# Patient Record
Sex: MALE | Race: WHITE | NOT HISPANIC OR LATINO | URBAN - METROPOLITAN AREA
[De-identification: names, ages, dates, MRNs, and addresses within clinical notes are randomized per-mention and may not be internally consistent; named-entity substitution may affect disease eponyms.]

---

## 2024-04-30 ENCOUNTER — APPOINTMENT (OUTPATIENT)
Dept: URBAN - METROPOLITAN AREA CLINIC 256 | Age: 69
Setting detail: DERMATOLOGY
End: 2024-04-30

## 2024-04-30 DIAGNOSIS — L85.3 XEROSIS CUTIS: ICD-10-CM

## 2024-04-30 DIAGNOSIS — L72.8 OTHER FOLLICULAR CYSTS OF THE SKIN AND SUBCUTANEOUS TISSUE: ICD-10-CM

## 2024-04-30 DIAGNOSIS — L08.9 LOCAL INFECTION OF THE SKIN AND SUBCUTANEOUS TISSUE, UNSPECIFIED: ICD-10-CM

## 2024-04-30 PROCEDURE — OTHER MONITORING: OTHER

## 2024-04-30 PROCEDURE — OTHER MIPS QUALITY: OTHER

## 2024-04-30 PROCEDURE — OTHER PATIENT SPECIFIC COUNSELING: OTHER

## 2024-04-30 PROCEDURE — OTHER PRESCRIPTION MEDICATION MANAGEMENT: OTHER

## 2024-04-30 PROCEDURE — OTHER PRESCRIPTION: OTHER

## 2024-04-30 PROCEDURE — OTHER COUNSELING: OTHER

## 2024-04-30 PROCEDURE — OTHER PHOTO-DOCUMENTATION: OTHER

## 2024-04-30 PROCEDURE — 99214 OFFICE O/P EST MOD 30 MIN: CPT

## 2024-04-30 RX ORDER — CLINDAMYCIN PHOSPHATE 10 MG/ML
LOTION TOPICAL
Qty: 60 | Refills: 0 | Status: ERX | COMMUNITY
Start: 2024-04-30

## 2024-04-30 ASSESSMENT — LOCATION DETAILED DESCRIPTION DERM
LOCATION DETAILED: LEFT AREOLA
LOCATION DETAILED: LEFT MEDIAL PLANTAR MIDFOOT
LOCATION DETAILED: RIGHT RADIAL PALM
LOCATION DETAILED: RIGHT MEDIAL PLANTAR MIDFOOT

## 2024-04-30 ASSESSMENT — LOCATION SIMPLE DESCRIPTION DERM
LOCATION SIMPLE: RIGHT HAND
LOCATION SIMPLE: LEFT PLANTAR SURFACE
LOCATION SIMPLE: RIGHT PLANTAR SURFACE
LOCATION SIMPLE: LEFT CHEST

## 2024-04-30 ASSESSMENT — LOCATION ZONE DERM
LOCATION ZONE: FEET
LOCATION ZONE: TRUNK
LOCATION ZONE: HAND

## 2024-04-30 NOTE — HPI: ECZEMA (PATIENT REPORTED)
Where Is Your Eczema Located?: Right middle finger\\nChin and nose
List Prescription Topical Steroids That Worked Best (Separate Each Name With A Comma):: Fluticasone proprionate
Additional Comments (Use Complete Sentences): Used to get shots at clinic with quiroz steroids or something \\nUnsure how long ago last one \\nComes and goes usually around this time of the year uses steroid cream which helps\\nOnce a day until goes away used last this morning

## 2024-04-30 NOTE — PROCEDURE: PATIENT SPECIFIC COUNSELING
- Discussed possibility of inflamed duct/gland vs cyst. Advised he refrain from pressing to not further irritate the lesion. \\n- Advised against removal as it is only painful when pressed and does not show signs of inflamed cyst. \\n- Reassured patient that the growth appears benign today. If patient desires removal, must be with a either Dr. Caba or a general surgeon due to the area of lesion. \\n- Let clinic know if it enlarges, becomes too painful, or becomes inflamed.
Detail Level: Zone
- Discussed increasing frequency of topical steroid application to twice daily for up to two weeks. He is agreeable to this.\\n- Could consider steroidal injections in the future if still persisting.
-Pt reports longstanding history of having a 'bacterial infection' that occurs on his feet in the summer months.\\n-He notes Dr. Lewis would prescribe either oral or topical erythromycin and that this would improve/resolve his condition\\n-He doesn't have any evidence of flare today, but would like a RF for when this does occur.\\n-Send clindamycin as there wasn't an erythromycin pulling through in [facility]. Recommended he RTC if flaring is not controlled with this for further evaluation and management. Unable to see previous diagnoses or treatments in [facility] today.

## 2024-04-30 NOTE — PROCEDURE: PRESCRIPTION MEDICATION MANAGEMENT
Render In Strict Bullet Format?: No
Plan: RTC if still persisting, sooner if worsening.
Detail Level: Zone
Modify Regimen: Apply fluticasone propionate BID for two weeks or until clear.
Initiate Treatment: With flares apply clindamycin lotion to affected areas twice daily until clear

## 2024-04-30 NOTE — HPI: CYST
Is This A New Presentation, Or A Follow-Up?: Cyst
Additional History: Noted redness that has gone away